# Patient Record
Sex: FEMALE | Race: WHITE | NOT HISPANIC OR LATINO | Employment: FULL TIME | URBAN - METROPOLITAN AREA
[De-identification: names, ages, dates, MRNs, and addresses within clinical notes are randomized per-mention and may not be internally consistent; named-entity substitution may affect disease eponyms.]

---

## 2023-03-02 LAB
ALANINE AMINOTRANSFERASE (SGPT) (U/L) IN SER/PLAS: 14 U/L (ref 7–45)
ANION GAP IN SER/PLAS: 14 MMOL/L (ref 10–20)
BASOPHILS (10*3/UL) IN BLOOD BY AUTOMATED COUNT: 0.03 X10E9/L (ref 0–0.1)
BASOPHILS/100 LEUKOCYTES IN BLOOD BY AUTOMATED COUNT: 0.5 % (ref 0–2)
CALCIUM (MG/DL) IN SER/PLAS: 8.7 MG/DL (ref 8.6–10.3)
CARBON DIOXIDE, TOTAL (MMOL/L) IN SER/PLAS: 26 MMOL/L (ref 21–32)
CHLORIDE (MMOL/L) IN SER/PLAS: 101 MMOL/L (ref 98–107)
CHOLESTEROL (MG/DL) IN SER/PLAS: 241 MG/DL (ref 0–199)
CHOLESTEROL IN HDL (MG/DL) IN SER/PLAS: 54.5 MG/DL
CHOLESTEROL/HDL RATIO: 4.4
COBALAMIN (VITAMIN B12) (PG/ML) IN SER/PLAS: 408 PG/ML (ref 211–911)
CREATININE (MG/DL) IN SER/PLAS: 0.61 MG/DL (ref 0.5–1.05)
EOSINOPHILS (10*3/UL) IN BLOOD BY AUTOMATED COUNT: 0.08 X10E9/L (ref 0–0.4)
EOSINOPHILS/100 LEUKOCYTES IN BLOOD BY AUTOMATED COUNT: 1.3 % (ref 0–6)
ERYTHROCYTE DISTRIBUTION WIDTH (RATIO) BY AUTOMATED COUNT: 13.2 % (ref 11.5–14.5)
ERYTHROCYTE MEAN CORPUSCULAR HEMOGLOBIN CONCENTRATION (G/DL) BY AUTOMATED: 31.8 G/DL (ref 32–36)
ERYTHROCYTE MEAN CORPUSCULAR VOLUME (FL) BY AUTOMATED COUNT: 92 FL (ref 80–100)
ERYTHROCYTES (10*6/UL) IN BLOOD BY AUTOMATED COUNT: 4.29 X10E12/L (ref 4–5.2)
FERRITIN (UG/LL) IN SER/PLAS: 31 UG/L (ref 8–150)
FOLATE (NG/ML) IN SER/PLAS: 12.7 NG/ML
GFR FEMALE: >90 ML/MIN/1.73M2
GLUCOSE (MG/DL) IN SER/PLAS: 93 MG/DL (ref 74–99)
HEMATOCRIT (%) IN BLOOD BY AUTOMATED COUNT: 39.3 % (ref 36–46)
HEMOGLOBIN (G/DL) IN BLOOD: 12.5 G/DL (ref 12–16)
IMMATURE GRANULOCYTES/100 LEUKOCYTES IN BLOOD BY AUTOMATED COUNT: 0.5 % (ref 0–0.9)
IRON (UG/DL) IN SER/PLAS: 85 UG/DL (ref 35–150)
IRON BINDING CAPACITY (UG/DL) IN SER/PLAS: 404 UG/DL (ref 240–445)
IRON SATURATION (%) IN SER/PLAS: 21 % (ref 25–45)
LDL: 161 MG/DL (ref 0–99)
LEUKOCYTES (10*3/UL) IN BLOOD BY AUTOMATED COUNT: 6.1 X10E9/L (ref 4.4–11.3)
LYMPHOCYTES (10*3/UL) IN BLOOD BY AUTOMATED COUNT: 2.08 X10E9/L (ref 0.8–3)
LYMPHOCYTES/100 LEUKOCYTES IN BLOOD BY AUTOMATED COUNT: 34.3 % (ref 13–44)
MONOCYTES (10*3/UL) IN BLOOD BY AUTOMATED COUNT: 0.61 X10E9/L (ref 0.05–0.8)
MONOCYTES/100 LEUKOCYTES IN BLOOD BY AUTOMATED COUNT: 10.1 % (ref 2–10)
NEUTROPHILS (10*3/UL) IN BLOOD BY AUTOMATED COUNT: 3.23 X10E9/L (ref 1.6–5.5)
NEUTROPHILS/100 LEUKOCYTES IN BLOOD BY AUTOMATED COUNT: 53.3 % (ref 40–80)
PLATELETS (10*3/UL) IN BLOOD AUTOMATED COUNT: 377 X10E9/L (ref 150–450)
POTASSIUM (MMOL/L) IN SER/PLAS: 4.8 MMOL/L (ref 3.5–5.3)
SODIUM (MMOL/L) IN SER/PLAS: 136 MMOL/L (ref 136–145)
TRIGLYCERIDE (MG/DL) IN SER/PLAS: 126 MG/DL (ref 0–149)
UREA NITROGEN (MG/DL) IN SER/PLAS: 14 MG/DL (ref 6–23)
VLDL: 25 MG/DL (ref 0–40)

## 2023-03-07 DIAGNOSIS — E78.5 HYPERLIPIDEMIA, UNSPECIFIED HYPERLIPIDEMIA TYPE: ICD-10-CM

## 2023-03-07 DIAGNOSIS — K21.00 GASTROESOPHAGEAL REFLUX DISEASE WITH ESOPHAGITIS, UNSPECIFIED WHETHER HEMORRHAGE: ICD-10-CM

## 2023-03-07 RX ORDER — OMEPRAZOLE 40 MG/1
40 CAPSULE, DELAYED RELEASE ORAL
Qty: 90 CAPSULE | Refills: 1 | Status: SHIPPED | OUTPATIENT
Start: 2023-03-07 | End: 2023-09-01

## 2023-03-07 RX ORDER — OMEPRAZOLE 40 MG/1
CAPSULE, DELAYED RELEASE ORAL
COMMUNITY
Start: 2020-07-23 | End: 2023-03-07 | Stop reason: SDUPTHER

## 2023-03-07 RX ORDER — ROSUVASTATIN CALCIUM 10 MG/1
10 TABLET, COATED ORAL DAILY
Qty: 90 TABLET | Refills: 1 | Status: SHIPPED | OUTPATIENT
Start: 2023-03-07 | End: 2023-10-19

## 2023-03-14 ENCOUNTER — TELEPHONE (OUTPATIENT)
Dept: PRIMARY CARE | Facility: CLINIC | Age: 73
End: 2023-03-14
Payer: MEDICARE

## 2023-03-14 NOTE — TELEPHONE ENCOUNTER
Pt left a msg stating that she thought Dr. Sierra was putting in a referral to see Dr. Gutierrez.  She is trying to make an appt with them .

## 2023-03-14 NOTE — TELEPHONE ENCOUNTER
Referral was entered into old EMR prior to conversion.  Per notes IN old EMR was faxed to his office.  Please ask her to let us know if she is having difficulty getting in

## 2023-03-17 NOTE — TELEPHONE ENCOUNTER
Called the pt to let her know the referral was sent.  She said that the day after she tried to make an appt, they called her and said they had what they needed and made her an appt.

## 2023-05-24 ENCOUNTER — TELEPHONE (OUTPATIENT)
Dept: PRIMARY CARE | Facility: CLINIC | Age: 73
End: 2023-05-24
Payer: MEDICARE

## 2023-05-24 NOTE — TELEPHONE ENCOUNTER
Pt left a msg asking for refills of her Sumatriptan Nasal Spray 5mg, Methylphenidate 20mg.  [facility] is [facility]

## 2023-05-30 DIAGNOSIS — F90.9 ATTENTION DEFICIT HYPERACTIVITY DISORDER (ADHD), UNSPECIFIED ADHD TYPE: ICD-10-CM

## 2023-05-30 DIAGNOSIS — G43.809 OTHER MIGRAINE WITHOUT STATUS MIGRAINOSUS, NOT INTRACTABLE: ICD-10-CM

## 2023-05-30 RX ORDER — METHYLPHENIDATE HYDROCHLORIDE 20 MG/1
20 TABLET ORAL DAILY
COMMUNITY
Start: 2019-07-25 | End: 2023-05-30 | Stop reason: SDUPTHER

## 2023-05-30 RX ORDER — METHYLPHENIDATE HYDROCHLORIDE 20 MG/1
20 TABLET ORAL DAILY
Qty: 30 TABLET | Refills: 0 | Status: SHIPPED | OUTPATIENT
Start: 2023-05-30 | End: 2023-08-30 | Stop reason: SDUPTHER

## 2023-05-30 RX ORDER — SUMATRIPTAN 5 MG/1
1 SPRAY NASAL 4 TIMES DAILY PRN
COMMUNITY
Start: 2019-01-08 | End: 2023-05-30 | Stop reason: SDUPTHER

## 2023-05-30 RX ORDER — SUMATRIPTAN 5 MG/1
1 SPRAY NASAL 2 TIMES DAILY PRN
Qty: 1 EACH | Refills: 1 | Status: SHIPPED | OUTPATIENT
Start: 2023-05-30

## 2023-05-31 ENCOUNTER — TELEPHONE (OUTPATIENT)
Dept: PRIMARY CARE | Facility: CLINIC | Age: 73
End: 2023-05-31
Payer: MEDICARE

## 2023-05-31 NOTE — TELEPHONE ENCOUNTER
Pt left a msg stating that she went to the ER and was DX with a bronchial infection.  She has taken all the Z-pack she was given and is not better.  She is asking for a 7 day script of Erythromycin.  She doesn't have a fever, but has a bad cough and fatigue.  [facility] is [facility].

## 2023-06-01 ENCOUNTER — TELEMEDICINE (OUTPATIENT)
Dept: PRIMARY CARE | Facility: CLINIC | Age: 73
End: 2023-06-01
Payer: MEDICARE

## 2023-06-01 DIAGNOSIS — D64.9 ANEMIA, UNSPECIFIED TYPE: ICD-10-CM

## 2023-06-01 DIAGNOSIS — D84.9 IMMUNE DEFICIENCY DISORDER (MULTI): ICD-10-CM

## 2023-06-01 DIAGNOSIS — J40 BRONCHITIS: Primary | ICD-10-CM

## 2023-06-01 DIAGNOSIS — R91.1 PULMONARY NODULE: ICD-10-CM

## 2023-06-01 DIAGNOSIS — R93.1 AGATSTON CORONARY ARTERY CALCIUM SCORE LESS THAN 100: ICD-10-CM

## 2023-06-01 DIAGNOSIS — F33.9 RECURRENT MAJOR DEPRESSIVE DISORDER, REMISSION STATUS UNSPECIFIED (CMS-HCC): ICD-10-CM

## 2023-06-01 DIAGNOSIS — E87.1 HYPONATREMIA: ICD-10-CM

## 2023-06-01 DIAGNOSIS — E78.5 HYPERLIPIDEMIA, UNSPECIFIED HYPERLIPIDEMIA TYPE: ICD-10-CM

## 2023-06-01 PROCEDURE — 99214 OFFICE O/P EST MOD 30 MIN: CPT | Performed by: INTERNAL MEDICINE

## 2023-06-01 RX ORDER — PREDNISONE 20 MG/1
40 TABLET ORAL DAILY
Qty: 10 TABLET | Refills: 0 | Status: SHIPPED | OUTPATIENT
Start: 2023-06-01 | End: 2023-06-06

## 2023-06-01 RX ORDER — BENZONATATE 100 MG/1
100 CAPSULE ORAL 3 TIMES DAILY PRN
Qty: 42 CAPSULE | Refills: 0 | Status: SHIPPED | OUTPATIENT
Start: 2023-06-01 | End: 2023-07-01

## 2023-06-01 RX ORDER — DOXYCYCLINE 100 MG/1
200 CAPSULE ORAL ONCE
Qty: 2 CAPSULE | Refills: 0 | Status: SHIPPED | OUTPATIENT
Start: 2023-06-01 | End: 2023-06-01

## 2023-06-01 ASSESSMENT — ENCOUNTER SYMPTOMS
MYALGIAS: 0
HEADACHES: 0
SORE THROAT: 0
HEARTBURN: 0
WHEEZING: 1
FEVER: 0
CHILLS: 0
COUGH: 1
RHINORRHEA: 0
HEMOPTYSIS: 0
SHORTNESS OF BREATH: 1
WEIGHT LOSS: 0
SWEATS: 1

## 2023-06-01 NOTE — PROGRESS NOTES
Subjective   Patient ID: Sheridan Moran is a 73 y.o. female who presents for cough.    Cough  The cough is Non-productive. Associated symptoms include chest pain, shortness of breath, sweats and wheezing. Pertinent negatives include no chills, ear congestion, ear pain, fever, headaches, heartburn, hemoptysis, myalgias, nasal congestion, postnasal drip, rash, rhinorrhea, sore throat or weight loss.        Review of Systems   Constitutional:  Negative for chills, fever and weight loss.   HENT:  Negative for ear pain, postnasal drip, rhinorrhea and sore throat.    Respiratory:  Positive for cough, shortness of breath and wheezing. Negative for hemoptysis.    Cardiovascular:  Positive for chest pain.   Gastrointestinal:  Negative for heartburn.   Musculoskeletal:  Negative for myalgias.   Skin:  Negative for rash.   Neurological:  Negative for headaches.       Objective   There were no vitals taken for this visit.    Physical Exam      Lab Results   Component Value Date    WBC 4.7 05/27/2023    HGB 11.4 (L) 05/27/2023    HCT 34.4 (L) 05/27/2023     05/27/2023    CHOL 241 (H) 03/02/2023    TRIG 126 03/02/2023    HDL 54.5 03/02/2023    ALT 17 05/27/2023    AST 22 05/27/2023     (L) 05/27/2023    K 3.1 (L) 05/27/2023    CL 96 (L) 05/27/2023    CREATININE 0.67 05/27/2023    BUN 8 05/27/2023    CO2 26 05/27/2023    TSH 1.34 10/26/2020    INR 1.1 05/27/2023      Chest x-ray    COMPARISON:  01/21/2023  TECHNIQUE:  Frontal and lateral view of the chest     FINDINGS:  HARDWARE:  None.  CARDIOMEDIASTINAL SILHOUETTE:  Within normal limits.  LUNGS AND COSTOPHRENIC ANGLES:  Clear. No pneumothorax.  BONES/SOFT TISSUES:  No acute abnormality.  UPPER ABDOMEN:  Unremarkable.     IMPRESSION:  1.  No acute finding.    Assessment/Plan       Bronchitis.  Persistent symptoms.  Chest x-ray unremarkable.  No fever chills or shortness of breath.  Repeat course of antibiotics.  Short course of prednisone.  Asked patient to go to the  ED any increasing shortness of breath.  Follow-up with me if not better in the next 2 to 3 days.  Reviewed use and risk of antibiotics.  Discussed risk of prednisone.    Hyponatremia-noted in the emergency department.  Repeat BMP.  Orders entered.  Reviewed with patient    H/o recurrent pneumonia. Certainly raises questions. Has seen pulmonology and immunology in the past.  At last visit I recommended evaluation with pulmonology and allergy again.  She did not make these appointments.  Today I spent time stressing importance of this.  Recommend appointment directly, consult reentered.       CACS-<100, reviewed.  On statin           #1 pulm nodule- f/u CTs in hospital did not show nodule. We will obtain follow-up CT. We will also re-consult pulmonology.. reviewed and stressed importance. Order in EMR. Reviewed importance of this follow-up testing. Discussed differential diagnosis including malignancy.  #2 compression fracture- again reviewed with patient again. Encouraged patient to have bone density test. Stressed importance further treatment/evaluation pending. We discussed potential outcomes with untreated osteoporosis. She voiced clear understanding  #3 Lower abdominal pain, change in bowels. resolved. f/u GI   #4 ADD- stable for several years. Continue treatment. Clearly with improved function with treatment..   #5 colon polys- f/u scope 2025  #6 depression- good, con't rx  #7 diarrhea- resolved. s/p GI eval.   #8 toe pain- resolved  #9 left adnexal cyst-noted on CT 9/21 by GI. Reviewed again I with patient. I again strongly recommend gynecologic evaluation. Stressed importance and reviewed Ddx (benign versus malignant). offered to help make appointment. She declines.  I again spent time stressing importance of this evaluation  #10 coronary artery calcifications noted on CT chest--> coronary artery calcium score = 66.    #11 increased LDL-on statin.  Continue.  Check labs.  Orders entered Patient ID: Sheridan  Dean is a 73 y.o. female.    Proceduresrec mammo. reviewed importance again. she will consider. Spent time discussing importance of screening breast cancer testing  COVID booster, recommend strongly  PVAx23- s/p 2022, prevnar next spring

## 2023-06-02 PROBLEM — G43.909 MIGRAINE: Status: ACTIVE | Noted: 2023-06-02

## 2023-06-02 PROBLEM — E87.1 HYPONATREMIA: Status: ACTIVE | Noted: 2023-06-02

## 2023-06-02 PROBLEM — D84.9 IMMUNE DEFICIENCY DISORDER (MULTI): Status: ACTIVE | Noted: 2023-06-02

## 2023-06-02 PROBLEM — F98.8 ADD (ATTENTION DEFICIT DISORDER): Status: ACTIVE | Noted: 2023-06-02

## 2023-06-02 PROBLEM — F33.9 RECURRENT MAJOR DEPRESSIVE DISORDER, REMISSION STATUS UNSPECIFIED (CMS-HCC): Status: ACTIVE | Noted: 2023-06-02

## 2023-06-02 PROBLEM — K21.9 GASTROESOPHAGEAL REFLUX DISEASE: Status: ACTIVE | Noted: 2023-06-02

## 2023-06-02 PROBLEM — R93.1 AGATSTON CORONARY ARTERY CALCIUM SCORE LESS THAN 100: Status: ACTIVE | Noted: 2023-06-02

## 2023-06-02 PROBLEM — D64.9 ANEMIA: Status: ACTIVE | Noted: 2023-06-02

## 2023-06-02 PROBLEM — R91.1 PULMONARY NODULE: Status: ACTIVE | Noted: 2023-06-02

## 2023-06-02 NOTE — PATIENT INSTRUCTIONS
Please take the antibiotics and prednisone as we discussed.  Notify me if you are not getting better and go to the emergency department if your symptoms progress.  Please make an appointment to see pulmonology as we reviewed as well as a CAT scan of your chest.  Have lab works ASAP.

## 2023-06-14 ENCOUNTER — TELEPHONE (OUTPATIENT)
Dept: PRIMARY CARE | Facility: CLINIC | Age: 73
End: 2023-06-14

## 2023-06-14 ENCOUNTER — TELEMEDICINE (OUTPATIENT)
Dept: PRIMARY CARE | Facility: CLINIC | Age: 73
End: 2023-06-14
Payer: MEDICARE

## 2023-06-14 DIAGNOSIS — J06.9 URTI (ACUTE UPPER RESPIRATORY INFECTION): ICD-10-CM

## 2023-06-14 DIAGNOSIS — R52 PAIN: ICD-10-CM

## 2023-06-14 DIAGNOSIS — R05.1 ACUTE COUGH: Primary | ICD-10-CM

## 2023-06-14 PROCEDURE — 99214 OFFICE O/P EST MOD 30 MIN: CPT | Performed by: INTERNAL MEDICINE

## 2023-06-14 RX ORDER — CEPHALEXIN 500 MG/1
500 CAPSULE ORAL 3 TIMES DAILY
Qty: 30 CAPSULE | Refills: 0 | Status: SHIPPED | OUTPATIENT
Start: 2023-06-14 | End: 2023-06-24

## 2023-06-14 RX ORDER — IBUPROFEN 800 MG/1
800 TABLET ORAL 3 TIMES DAILY PRN
Qty: 30 TABLET | Refills: 0 | Status: SHIPPED | OUTPATIENT
Start: 2023-06-14 | End: 2023-07-14

## 2023-06-14 RX ORDER — METHYLPREDNISOLONE 4 MG/1
TABLET ORAL
Qty: 21 TABLET | Refills: 0 | Status: SHIPPED | OUTPATIENT
Start: 2023-06-14

## 2023-06-14 ASSESSMENT — ENCOUNTER SYMPTOMS
PND: 0
SYNCOPE: 0
SHORTNESS OF BREATH: 1
LEG PAIN: 0
WHEEZING: 1
ABDOMINAL PAIN: 0
HEADACHES: 0
SPUTUM PRODUCTION: 0
VOMITING: 0
NECK PAIN: 0
ORTHOPNEA: 0
RHINORRHEA: 0
SWOLLEN GLANDS: 0
HEMOPTYSIS: 0
CLAUDICATION: 0
FEVER: 0

## 2023-06-14 NOTE — TELEPHONE ENCOUNTER
Pt left a msg stating that she can't get in to see Dr. Oropeza  until Sept.  She said you told her to call if she is unable to bee seen in 6 weeks. She still has a horrible cough.   Pt would also  like a script for an inhaler.

## 2023-06-14 NOTE — PROGRESS NOTES
Subjective   Patient ID: [id number]   \[facility]\""  Virtual visit.  Sheridan Moran is a 73 y.o. female who presents for No chief complaint on file..She is having very bad cough for more than a week.She had this kind of episode before and needed steroid.        Objective   There were no vitals taken for this visit.   Review of Systems  All 12 systems reviewed, no other abnormality except that mentioned in HPI.    Physical Exam  Constitutional- no abnormality  ENT- no abnormality  Neck- no swelling  CVS- normal S1 and S2, no murmur.  Pulmonary- clear to auscultation,no rhonchi, no wheezes.  Abdomen- normal- liver and spleen, soft, no distension, bowel sound present.  Neurological- all cranial nerves intact, speech and gait normal, no sensory or motor deficiency.  Musculoskeletal- all pulses are normal, normal movement, no joint swelling.  Skin- no rash, dry.  psychiatry- no suicidal ideation.  Genitourinary system- no abnormality.  Lymph node- no lyphadenopathy      Assessment/Plan   Problem List Items Addressed This Visit    None  Visit Diagnoses       Acute cough    -  Primary    Relevant Medications    methylPREDNISolone (Medrol Dospak) 4 mg tablets    URTI (acute upper respiratory infection)        Relevant Medications    cephalexin (Keflex) 500 mg capsule    Pain        Relevant Medications    ibuprofen 800 mg tablet            Jj Cuello MD

## 2023-06-20 NOTE — TELEPHONE ENCOUNTER
I spoke with the pt and she said that she will schedule the follow up CT.  She said that she feels all her breathing issues and infections are due to her many exposures to mold.  She did say that she is doing better after the 2nd round of meds.  She still wants to know what to do about not being able to see Dr. Oropeza until Sept.

## 2023-06-27 NOTE — TELEPHONE ENCOUNTER
Called the pt and asked if she wanted to be seen earlier.  She said she would just wait til Sept to see Dr. Oropeza.

## 2023-07-12 ENCOUNTER — TELEPHONE (OUTPATIENT)
Dept: PRIMARY CARE | Facility: CLINIC | Age: 73
End: 2023-07-12
Payer: MEDICARE

## 2023-08-17 ENCOUNTER — TELEPHONE (OUTPATIENT)
Dept: PRIMARY CARE | Facility: CLINIC | Age: 73
End: 2023-08-17
Payer: MEDICARE

## 2023-08-17 NOTE — TELEPHONE ENCOUNTER
Pt left a msg stating that her cough is starting up again , along with green mucus.  She is asking for an A/B as she will be leaving out of town to [place] on an emergency and will need this by tomorrow.

## 2023-08-30 ENCOUNTER — TELEMEDICINE (OUTPATIENT)
Dept: PRIMARY CARE | Facility: CLINIC | Age: 73
End: 2023-08-30
Payer: MEDICARE

## 2023-08-30 DIAGNOSIS — E78.5 HYPERLIPIDEMIA, UNSPECIFIED HYPERLIPIDEMIA TYPE: ICD-10-CM

## 2023-08-30 DIAGNOSIS — F90.9 ATTENTION DEFICIT HYPERACTIVITY DISORDER (ADHD), UNSPECIFIED ADHD TYPE: ICD-10-CM

## 2023-08-30 PROCEDURE — 99213 OFFICE O/P EST LOW 20 MIN: CPT | Performed by: INTERNAL MEDICINE

## 2023-08-30 RX ORDER — METHYLPHENIDATE HYDROCHLORIDE 20 MG/1
20 TABLET ORAL DAILY
Qty: 30 TABLET | Refills: 0 | Status: SHIPPED | OUTPATIENT
Start: 2023-08-30 | End: 2023-10-06 | Stop reason: SDUPTHER

## 2023-08-30 NOTE — PROGRESS NOTES
Subjective   Patient ID: [id number]   [facility]  Virtual visit  Sheridan Moran is a 73 y.o. female who presents for No chief complaint on file..She need Medication  refill.        Objective   There were no vitals taken for this visit.   Review of Systems  Physical Exam    Assessment/Plan   Problem List Items Addressed This Visit       ADD (attention deficit disorder)    Relevant Medications    methylphenidate (Ritalin) 20 mg tablet     Other Visit Diagnoses       Hyperlipidemia, unspecified hyperlipidemia type            Her Medication refilled for, next she will make an physical appointment with me.    Jj Cuello MD

## 2023-08-31 DIAGNOSIS — K21.00 GASTROESOPHAGEAL REFLUX DISEASE WITH ESOPHAGITIS, UNSPECIFIED WHETHER HEMORRHAGE: ICD-10-CM

## 2023-09-01 RX ORDER — OMEPRAZOLE 40 MG/1
CAPSULE, DELAYED RELEASE ORAL
Qty: 90 CAPSULE | Refills: 0 | Status: SHIPPED | OUTPATIENT
Start: 2023-09-01 | End: 2023-11-06

## 2023-09-25 DIAGNOSIS — F90.9 ATTENTION DEFICIT HYPERACTIVITY DISORDER (ADHD), UNSPECIFIED ADHD TYPE: Primary | ICD-10-CM

## 2023-09-25 RX ORDER — SERTRALINE HYDROCHLORIDE 50 MG/1
50 TABLET, FILM COATED ORAL DAILY
Qty: 90 TABLET | Refills: 0 | Status: SHIPPED | OUTPATIENT
Start: 2023-09-25

## 2023-10-06 DIAGNOSIS — F90.9 ATTENTION DEFICIT HYPERACTIVITY DISORDER (ADHD), UNSPECIFIED ADHD TYPE: ICD-10-CM

## 2023-10-06 RX ORDER — METHYLPHENIDATE HYDROCHLORIDE 20 MG/1
20 TABLET ORAL DAILY
Qty: 30 TABLET | Refills: 0 | Status: SHIPPED | OUTPATIENT
Start: 2023-10-06 | End: 2023-11-05

## 2023-10-18 DIAGNOSIS — E78.5 HYPERLIPIDEMIA, UNSPECIFIED HYPERLIPIDEMIA TYPE: ICD-10-CM

## 2023-10-19 RX ORDER — ROSUVASTATIN CALCIUM 10 MG/1
10 TABLET, COATED ORAL DAILY
Qty: 90 TABLET | Refills: 0 | Status: SHIPPED | OUTPATIENT
Start: 2023-10-19

## 2023-11-06 ENCOUNTER — APPOINTMENT (OUTPATIENT)
Dept: PRIMARY CARE | Facility: CLINIC | Age: 73
End: 2023-11-06
Payer: MEDICARE

## 2023-11-06 DIAGNOSIS — K21.00 GASTROESOPHAGEAL REFLUX DISEASE WITH ESOPHAGITIS, UNSPECIFIED WHETHER HEMORRHAGE: ICD-10-CM

## 2023-11-06 RX ORDER — OMEPRAZOLE 40 MG/1
CAPSULE, DELAYED RELEASE ORAL
Qty: 90 CAPSULE | Refills: 0 | Status: SHIPPED | OUTPATIENT
Start: 2023-11-06

## 2024-07-30 ENCOUNTER — LAB (OUTPATIENT)
Dept: LAB | Facility: LAB | Age: 74
End: 2024-07-30
Payer: MEDICARE

## 2024-07-30 DIAGNOSIS — E27.1 PRIMARY ADRENOCORTICAL INSUFFICIENCY (MULTI): ICD-10-CM

## 2024-07-30 DIAGNOSIS — E55.9 VITAMIN D DEFICIENCY, UNSPECIFIED: ICD-10-CM

## 2024-07-30 DIAGNOSIS — G93.31 POSTVIRAL FATIGUE SYNDROME: Primary | ICD-10-CM

## 2024-07-30 DIAGNOSIS — D51.0 VITAMIN B12 DEFICIENCY ANEMIA DUE TO INTRINSIC FACTOR DEFICIENCY: ICD-10-CM

## 2024-07-30 DIAGNOSIS — E63.8 OTHER SPECIFIED NUTRITIONAL DEFICIENCIES: ICD-10-CM

## 2024-07-30 DIAGNOSIS — E03.9 HYPOTHYROIDISM, UNSPECIFIED: ICD-10-CM

## 2024-07-30 DIAGNOSIS — D52.0 DIETARY FOLATE DEFICIENCY ANEMIA: ICD-10-CM

## 2024-07-30 DIAGNOSIS — R73.09 OTHER ABNORMAL GLUCOSE: ICD-10-CM

## 2024-07-30 DIAGNOSIS — E61.1 IRON DEFICIENCY: ICD-10-CM

## 2024-07-30 DIAGNOSIS — R79.89 OTHER SPECIFIED ABNORMAL FINDINGS OF BLOOD CHEMISTRY: ICD-10-CM

## 2024-07-30 LAB
25(OH)D3 SERPL-MCNC: 38 NG/ML (ref 30–100)
ALBUMIN SERPL BCP-MCNC: 4.1 G/DL (ref 3.4–5)
ALP SERPL-CCNC: 67 U/L (ref 33–136)
ALT SERPL W P-5'-P-CCNC: 12 U/L (ref 7–45)
ANION GAP SERPL CALC-SCNC: 10 MMOL/L (ref 10–20)
AST SERPL W P-5'-P-CCNC: 16 U/L (ref 9–39)
BASOPHILS # BLD AUTO: 0.03 X10*3/UL (ref 0–0.1)
BASOPHILS NFR BLD AUTO: 0.6 %
BILIRUB SERPL-MCNC: 0.5 MG/DL (ref 0–1.2)
BUN SERPL-MCNC: 10 MG/DL (ref 6–23)
CALCIUM SERPL-MCNC: 8.9 MG/DL (ref 8.6–10.3)
CHLORIDE SERPL-SCNC: 102 MMOL/L (ref 98–107)
CO2 SERPL-SCNC: 30 MMOL/L (ref 21–32)
CORTIS SERPL-MCNC: 13.4 UG/DL (ref 2.5–20)
CREAT SERPL-MCNC: 0.63 MG/DL (ref 0.5–1.05)
CRP SERPL HS-MCNC: 1.8 MG/L
EGFRCR SERPLBLD CKD-EPI 2021: >90 ML/MIN/1.73M*2
EOSINOPHIL # BLD AUTO: 0.07 X10*3/UL (ref 0–0.4)
EOSINOPHIL NFR BLD AUTO: 1.5 %
ERYTHROCYTE [DISTWIDTH] IN BLOOD BY AUTOMATED COUNT: 13.7 % (ref 11.5–14.5)
EST. AVERAGE GLUCOSE BLD GHB EST-MCNC: 126 MG/DL
FERRITIN SERPL-MCNC: 20 NG/ML (ref 8–150)
FOLATE SERPL-MCNC: 19.9 NG/ML
GLUCOSE SERPL-MCNC: 91 MG/DL (ref 74–99)
HBA1C MFR BLD: 6 %
HCT VFR BLD AUTO: 40.6 % (ref 36–46)
HGB BLD-MCNC: 13.2 G/DL (ref 12–16)
IMM GRANULOCYTES # BLD AUTO: 0.01 X10*3/UL (ref 0–0.5)
IMM GRANULOCYTES NFR BLD AUTO: 0.2 % (ref 0–0.9)
INSULIN SERPL-ACNC: 6 UIU/ML (ref 3–25)
IRON SATN MFR SERPL: 27 % (ref 25–45)
IRON SERPL-MCNC: 117 UG/DL (ref 35–150)
LYMPHOCYTES # BLD AUTO: 1.66 X10*3/UL (ref 0.8–3)
LYMPHOCYTES NFR BLD AUTO: 35 %
MCH RBC QN AUTO: 29.5 PG (ref 26–34)
MCHC RBC AUTO-ENTMCNC: 32.5 G/DL (ref 32–36)
MCV RBC AUTO: 91 FL (ref 80–100)
MONOCYTES # BLD AUTO: 0.53 X10*3/UL (ref 0.05–0.8)
MONOCYTES NFR BLD AUTO: 11.2 %
NEUTROPHILS # BLD AUTO: 2.44 X10*3/UL (ref 1.6–5.5)
NEUTROPHILS NFR BLD AUTO: 51.5 %
NRBC BLD-RTO: 0 /100 WBCS (ref 0–0)
PLATELET # BLD AUTO: 325 X10*3/UL (ref 150–450)
POTASSIUM SERPL-SCNC: 4.6 MMOL/L (ref 3.5–5.3)
PROT SERPL-MCNC: 6.6 G/DL (ref 6.4–8.2)
RBC # BLD AUTO: 4.47 X10*6/UL (ref 4–5.2)
SODIUM SERPL-SCNC: 137 MMOL/L (ref 136–145)
TIBC SERPL-MCNC: 427 UG/DL (ref 240–445)
TSH SERPL-ACNC: 1.49 MIU/L (ref 0.44–3.98)
UIBC SERPL-MCNC: 310 UG/DL (ref 110–370)
VIT B12 SERPL-MCNC: 776 PG/ML (ref 211–911)
WBC # BLD AUTO: 4.7 X10*3/UL (ref 4.4–11.3)

## 2024-07-30 PROCEDURE — 83525 ASSAY OF INSULIN: CPT

## 2024-07-30 PROCEDURE — 82728 ASSAY OF FERRITIN: CPT

## 2024-07-30 PROCEDURE — 86141 C-REACTIVE PROTEIN HS: CPT | Mod: WAIVER OF LIABILITY ON FILE

## 2024-07-30 PROCEDURE — 83550 IRON BINDING TEST: CPT

## 2024-07-30 PROCEDURE — 82533 TOTAL CORTISOL: CPT

## 2024-07-30 PROCEDURE — 36415 COLL VENOUS BLD VENIPUNCTURE: CPT

## 2024-07-30 PROCEDURE — 82306 VITAMIN D 25 HYDROXY: CPT

## 2024-07-30 PROCEDURE — 82607 VITAMIN B-12: CPT

## 2024-07-30 PROCEDURE — 85025 COMPLETE CBC W/AUTO DIFF WBC: CPT

## 2024-07-30 PROCEDURE — 84443 ASSAY THYROID STIM HORMONE: CPT

## 2024-07-30 PROCEDURE — 82746 ASSAY OF FOLIC ACID SERUM: CPT

## 2024-07-30 PROCEDURE — 83540 ASSAY OF IRON: CPT

## 2024-07-30 PROCEDURE — 83036 HEMOGLOBIN GLYCOSYLATED A1C: CPT

## 2024-07-30 PROCEDURE — 80053 COMPREHEN METABOLIC PANEL: CPT

## 2024-09-26 ENCOUNTER — APPOINTMENT (OUTPATIENT)
Dept: PRIMARY CARE | Facility: CLINIC | Age: 74
End: 2024-09-26
Payer: MEDICARE